# Patient Record
Sex: FEMALE | Race: OTHER | HISPANIC OR LATINO | URBAN - METROPOLITAN AREA
[De-identification: names, ages, dates, MRNs, and addresses within clinical notes are randomized per-mention and may not be internally consistent; named-entity substitution may affect disease eponyms.]

---

## 2021-02-12 ENCOUNTER — EMERGENCY (EMERGENCY)
Facility: HOSPITAL | Age: 66
LOS: 1 days | Discharge: HOME | End: 2021-02-12
Attending: EMERGENCY MEDICINE
Payer: MEDICARE

## 2021-02-12 VITALS
RESPIRATION RATE: 18 BRPM | OXYGEN SATURATION: 98 % | SYSTOLIC BLOOD PRESSURE: 140 MMHG | DIASTOLIC BLOOD PRESSURE: 78 MMHG | TEMPERATURE: 98 F | HEART RATE: 68 BPM

## 2021-02-12 VITALS
WEIGHT: 162.04 LBS | RESPIRATION RATE: 17 BRPM | HEIGHT: 60 IN | DIASTOLIC BLOOD PRESSURE: 83 MMHG | HEART RATE: 72 BPM | OXYGEN SATURATION: 96 % | SYSTOLIC BLOOD PRESSURE: 138 MMHG | TEMPERATURE: 99 F

## 2021-02-12 LAB
ANION GAP SERPL CALC-SCNC: 11 MMOL/L — SIGNIFICANT CHANGE UP (ref 5–17)
BUN SERPL-MCNC: 18 MG/DL — SIGNIFICANT CHANGE UP (ref 7–23)
CALCIUM SERPL-MCNC: 9.8 MG/DL — SIGNIFICANT CHANGE UP (ref 8.4–10.5)
CHLORIDE SERPL-SCNC: 104 MMOL/L — SIGNIFICANT CHANGE UP (ref 96–108)
CO2 SERPL-SCNC: 23 MMOL/L — SIGNIFICANT CHANGE UP (ref 22–31)
CREAT SERPL-MCNC: 0.4 MG/DL — LOW (ref 0.5–1.3)
GLUCOSE SERPL-MCNC: 95 MG/DL — SIGNIFICANT CHANGE UP (ref 70–99)
POTASSIUM SERPL-MCNC: 3.9 MMOL/L — SIGNIFICANT CHANGE UP (ref 3.5–5.3)
POTASSIUM SERPL-SCNC: 3.9 MMOL/L — SIGNIFICANT CHANGE UP (ref 3.5–5.3)
SODIUM SERPL-SCNC: 138 MMOL/L — SIGNIFICANT CHANGE UP (ref 135–145)

## 2021-02-12 PROCEDURE — 99284 EMERGENCY DEPT VISIT MOD MDM: CPT | Mod: GC

## 2021-02-12 PROCEDURE — 96374 THER/PROPH/DIAG INJ IV PUSH: CPT

## 2021-02-12 PROCEDURE — 99284 EMERGENCY DEPT VISIT MOD MDM: CPT | Mod: 25

## 2021-02-12 PROCEDURE — 80048 BASIC METABOLIC PNL TOTAL CA: CPT

## 2021-02-12 RX ORDER — FLUORESCEIN SODIUM 9 MG
1 STRIP OPHTHALMIC (EYE) ONCE
Refills: 0 | Status: COMPLETED | OUTPATIENT
Start: 2021-02-12 | End: 2021-02-12

## 2021-02-12 RX ORDER — KETOROLAC TROMETHAMINE 30 MG/ML
15 SYRINGE (ML) INJECTION ONCE
Refills: 0 | Status: DISCONTINUED | OUTPATIENT
Start: 2021-02-12 | End: 2021-02-12

## 2021-02-12 RX ORDER — VALACYCLOVIR 500 MG/1
1 TABLET, FILM COATED ORAL
Qty: 21 | Refills: 0
Start: 2021-02-12 | End: 2021-02-18

## 2021-02-12 RX ORDER — VALACYCLOVIR 500 MG/1
1000 TABLET, FILM COATED ORAL ONCE
Refills: 0 | Status: COMPLETED | OUTPATIENT
Start: 2021-02-12 | End: 2021-02-12

## 2021-02-12 RX ADMIN — Medication 1 APPLICATION(S): at 11:55

## 2021-02-12 RX ADMIN — VALACYCLOVIR 1000 MILLIGRAM(S): 500 TABLET, FILM COATED ORAL at 11:54

## 2021-02-12 RX ADMIN — Medication 1 DROP(S): at 11:54

## 2021-02-12 RX ADMIN — Medication 15 MILLIGRAM(S): at 13:27

## 2021-02-12 NOTE — ED PROVIDER NOTE - ATTENDING CONTRIBUTION TO CARE
L eye pain / rash  very tender rash L eye  need to stain to determine if dendritic lesion. start on valtrex

## 2021-02-12 NOTE — ED PROVIDER NOTE - PATIENT PORTAL LINK FT
You can access the FollowMyHealth Patient Portal offered by [facility] by registering at the following website: [email or web address]. By joining [facility] [facility] [employer]’s FollowMyHealth portal, you will also be able to view your health information using other applications (apps) compatible with our system.

## 2021-02-12 NOTE — ED PROVIDER NOTE - SKIN, MLM
+vesicular rash above L eye and forehead V1 distribution that does not cross midline. Skin normal color for race, warm, dry and intact

## 2021-02-12 NOTE — ED PROVIDER NOTE - NSFOLLOWUPINSTRUCTIONS_ED_ALL_ED_FT
IMPORTANT INSTRUCTIONS FROM Dr. ANGEL:    Please follow up with your personal medical doctor in 24-48 hours.   Bring results from today to your visit.    Take valtrex as prescribed. Take 400mg of motrin or advil or ibuprofen (usually 2 tablets) every 4 hours for pain.     If you were advised to take any medications - be sure to review the package insert.    If your symptoms change, get worse or if you have any new symptoms, come to the ER right away. We do not expect you to have vision changes.  If you have any questions, call the ER at the phone number on this page.

## 2021-02-12 NOTE — ED PROVIDER NOTE - CROS ED ROS STATEMENT
----- Message from Justin Montenegro MD sent at 1/14/2020  4:17 PM CST -----  Please advise patient that duodenal biopsy was unremarkable.  Follow up as previously recommended.   all other ROS negative except as per HPI

## 2021-02-12 NOTE — ED PROVIDER NOTE - NSFOLLOWUPCLINICS_GEN_ALL_ED_FT
[facility] - Ophthalmology  Ophthalmology  [address] [place], [address]  [place] [address]  Phone: [phone]  Fax:   Follow Up Time: 1-3 Days

## 2021-02-12 NOTE — ED PROVIDER NOTE - OBJECTIVE STATEMENT
64y/o F w/ no PMH p/w 5d headache L side no exacerbating or alleviating factors radiates to the back of the head described as shooting pain. kyle has rash above L eye. Denies fevers, chills, nausea, vomiting, blurry vision, diplopia, photophobia, pruritis, foreign body sensation, pain with ocular movements, sinus pain, contact use.

## 2021-02-12 NOTE — ED PROVIDER NOTE - CLINICAL SUMMARY MEDICAL DECISION MAKING FREE TEXT BOX
64y/o F w/ L sided headache/eye pain with vesicular rash likely from zoster infection. PERRLA. No evidence of dissemination normal CN exam and no dendritic ulcers. No concern for GCA or acute closure glaucoma. Will check Cr, d/c home with valacyclovir and pmd f/u.

## 2021-02-12 NOTE — ED ADULT NURSE NOTE - OBJECTIVE STATEMENT
66 yo female from home A&OX4 c/o pain to forehead around to back of head w/ associated rash. Pt denies fever chills, chx pn, SOB, abd pn, n/v/d/dizziness. On appears unaffected, PERRL, neg changes in vision. Vs stable, IV access obtained in RAC via 18G catheter, labs drawn and sent. Pt medicated (see MAR), eye exam pending.

## 2022-11-11 NOTE — ED ADULT NURSE NOTE - SUICIDE SCREENING DEPRESSION
Negative
fourth metacarpal base fracture and fifth carpometacarpal fracture right hand--splint in place

## 2023-12-01 NOTE — ED ADULT NURSE NOTE - PAIN RATING/NUMBER SCALE (0-10): REST
TRANSITIONAL CARE MANAGEMENT - HOSPITAL DISCHARGE FOLLOW-UP    Contacted Ms. Díaz regarding follow-up for Covid after hospital discharge. She was discharged from the hospital on 11/30/23. Review of the After Visit Summary from the recent hospitalization indicates that the patient needs to f/u with pcp.    She feels that she is doing well at home. Other than new onset anxiety  Her diet concern is none. Overall, the patient is eating well.   Ambulation: staying the same  Fever: is not present  Pain: none, but lightheaded  Activities of Daily Living (global): Partial assistance   Patient states that she does have sufficient family support. She feels that she is able to ask for assistance when needed.     Additional patient/family concerns: None .    Discharge medications were Verified with the patient. She is fully compliant with the medication regimen prescribed at the time of discharge. She reports that she is not experiencing any medication side effects.    Upon discharge, the patient was to receive none.     Advance care planning documents on file - yes    Patient has an appointment on 12/6/23 at 2:20 with Kim Viveros MD. Ms. Díaz was reminded about the importance of keeping this appointment.     Reviewed anxiety with pcp and will be addressed at upcoming tcm appointment.  Reported that pt has experienced it previously but it went away.  Discussed square breathing, distraction to help.   7